# Patient Record
(demographics unavailable — no encounter records)

---

## 2024-12-05 NOTE — HISTORY OF PRESENT ILLNESS
[Post-hospitalization from ___ Hospital] : Post-hospitalization from [unfilled] Hospital [Admitted on: ___] : The patient was admitted on [unfilled] [Discharged on ___] : discharged on [unfilled] [FreeTextEntry2] : 73yo female with PMHx of dementia, HTN, HLD, osteoporosis presenting from home with AMS. Pt's son-[place] reports that pt recently treated with bactrim x 7 days for UTI, diagnosed at urgent care. Came in 11/28 with worsened AMS over the past day. Associated with fever of 100.5 and lethargy. Patient was found to have metabolic encephalopathy 2/2 UTI. Patient was treated with CTX. Blood cultures were NGTD, Urine culture grew Ecoli, sensitivities reviewed. Patient was transitioned to PO Ceftin. Patient has dementia at baseline. Patient complained of lower back pain, imaging was taken and negative. CT renal stone hunt negative. Patient was seen by PT and recommending OZZY.   Antibiotic on Discharge: Ceftin for 4 days, last day 12/4.